# Patient Record
Sex: FEMALE | Race: WHITE | Employment: FULL TIME | URBAN - METROPOLITAN AREA
[De-identification: names, ages, dates, MRNs, and addresses within clinical notes are randomized per-mention and may not be internally consistent; named-entity substitution may affect disease eponyms.]

---

## 2019-07-11 ENCOUNTER — APPOINTMENT (OUTPATIENT)
Dept: MRI IMAGING | Facility: CLINIC | Age: 29
End: 2019-07-11
Attending: EMERGENCY MEDICINE
Payer: MEDICAID

## 2019-07-11 ENCOUNTER — HOSPITAL ENCOUNTER (EMERGENCY)
Facility: CLINIC | Age: 29
Discharge: HOME | End: 2019-07-11
Attending: EMERGENCY MEDICINE | Admitting: EMERGENCY MEDICINE
Payer: MEDICAID

## 2019-07-11 VITALS
HEIGHT: 67 IN | DIASTOLIC BLOOD PRESSURE: 92 MMHG | SYSTOLIC BLOOD PRESSURE: 129 MMHG | TEMPERATURE: 98 F | WEIGHT: 205 LBS | BODY MASS INDEX: 32.18 KG/M2 | RESPIRATION RATE: 16 BRPM | OXYGEN SATURATION: 100 % | HEART RATE: 77 BPM

## 2019-07-11 DIAGNOSIS — R20.8 DYSESTHESIA: ICD-10-CM

## 2019-07-11 LAB — B-HCG FREE SERPL-ACNC: <5 IU/L

## 2019-07-11 PROCEDURE — 84702 CHORIONIC GONADOTROPIN TEST: CPT

## 2019-07-11 PROCEDURE — 99285 EMERGENCY DEPT VISIT HI MDM: CPT | Mod: 25

## 2019-07-11 PROCEDURE — 70553 MRI BRAIN STEM W/O & W/DYE: CPT

## 2019-07-11 PROCEDURE — 25500064 ZZH RX 255 OP 636: Performed by: EMERGENCY MEDICINE

## 2019-07-11 PROCEDURE — A9585 GADOBUTROL INJECTION: HCPCS | Performed by: EMERGENCY MEDICINE

## 2019-07-11 RX ORDER — GADOBUTROL 604.72 MG/ML
10 INJECTION INTRAVENOUS ONCE
Status: COMPLETED | OUTPATIENT
Start: 2019-07-11 | End: 2019-07-11

## 2019-07-11 RX ADMIN — GADOBUTROL 9 ML: 604.72 INJECTION INTRAVENOUS at 13:45

## 2019-07-11 ASSESSMENT — ENCOUNTER SYMPTOMS
NAUSEA: 0
DIARRHEA: 0
SPEECH DIFFICULTY: 0
NUMBNESS: 1
TROUBLE SWALLOWING: 0
VOMITING: 0

## 2019-07-11 ASSESSMENT — MIFFLIN-ST. JEOR: SCORE: 1692.5

## 2019-07-11 NOTE — DISCHARGE INSTRUCTIONS
Please make an appointment to follow up with [facility] [phone] as soon as possible.    You have a number of changes on your MRI called demyelination which is destruction of the insulation surrounding the nerves.  This is most concerning for multiple sclerosis or MS.

## 2019-07-11 NOTE — LETTER
07/11/19      To Whom it may concern:    ____Sarah Feldman__________ was in our Emergency Department today, 07/11/19. with a patient who needed their assistance.  Please excuse them from work/school.      Sincerely,

## 2019-07-11 NOTE — ED AVS SNAPSHOT
[facility] Emergency Department  [address]  [facility] [address]  Phone:  [phone]  Fax:  [phone]                                    Sera Crabtree   MRN: [id number]    Department:  [facility] Emergency Department   Date of Visit:  7/11/2019           After Visit Summary Signature Page    I have received my discharge instructions, and my questions have been answered. I have discussed any challenges I see with this plan with the nurse or doctor.    ..........................................................................................................................................  Patient/Patient Representative Signature      ..........................................................................................................................................  Patient Representative Print Name and Relationship to Patient    ..................................................               ................................................  Date                                   Time    ..........................................................................................................................................  Reviewed by Signature/Title    ...................................................              ..............................................  Date                                               Time          22EPIC Rev 08/18

## 2019-07-11 NOTE — ED PROVIDER NOTES
"  History     Chief Complaint:  Numbness      HPI   Sera Crabtree is a 29 year old female who presents to the ED for evaluation of numbness. The patient says that her symptoms began two days ago (Tuesday night). She says that she started feeling numbness and tingling in the left lower extremity. The next day (Wednesday morning), she says she felt the numbness in the left upper extremity. This morning she says she began getting numbness in the face and noticed some ringing in right ear. The patient says that she still has sensation everywhere, but cold sensations make her leg feel prickly. She also says that her left leg and foot feel really cold. She finds relief from her discomfort when she crosses her leg or takes a bath. The patient denies vision changes, problems swallowing or speaking, vomiting, nausea, and diarrhea.    Allergies:  No known drug allergies    Medications:    Norethindrone   metrogel    Past Medical History:    History reviewed.  No pertinent past medical history.    Past Surgical History:    History reviewed. No pertinent surgical history.    Family History:    History reviewed. No pertinent family history.     Social History:  Smoking status: Passive Smoke Exposure-Never Smoker  Arrived to the ED alone    Review of Systems   HENT: Positive for tinnitus. Negative for trouble swallowing.    Eyes: Negative for visual disturbance.   Gastrointestinal: Negative for diarrhea, nausea and vomiting.   Neurological: Positive for numbness. Negative for speech difficulty.   All other systems reviewed and are negative.      Physical Exam   First Vitals:  Patient Vitals for the past 24 hrs:   BP Temp Temp src Heart Rate Resp SpO2 Height Weight   07/11/19 1114 (!) 139/101 98  F (36.7  C) Temporal 83 16 99 % 1.702 m (5' 7\") 93 kg (205 lb)     Physical Exam    General:   Pleasant, age appropriate female resting comfortably in the bed.  HEENT:    Oropharynx is moist, without lesions or trismus.  Eyes: "    Conjunctiva normal     PERRL; EOMs intact  Neck:    Supple, no meningismus.     CV:     Regular rate and rhythm.      No murmurs, rubs or gallops.       No unilateral leg swelling.       2+ radial pulses bilateral.       No lower extremity edema.  PULM:    Clear to auscultation bilateral.       No respiratory distress.      Good air exchange.     No rales or wheezing.  ABD:    Soft, non-tender, non-distended.       No pulsatile masses.       No rebound, guarding or rigidity.  MSK:     No gross deformity to all four extremities.   LYMPH:   No cervical lymphadenopathy.  NEURO:   Alert & O x 3     CN II-XII intact, speech is clear with no aphasia.       Finger to nose within normal limits.  No pronator drift.       Strength is 5/5 in all 4 extremities.  Sensation is intact to light touch and pinprick      Normal muscular tone, no tremor.  Skin:    Warm, dry and intact.    Psych:    Mood is good and affect is appropriate.        Emergency Department Course       Imaging:  Radiographic findings were communicated with the patient who voiced understanding of the findings.    MRI Brain w/o & w Contrast  IMPRESSION:     IMPRESSION: Multiple abnormal white matter lesions with orientation  strongly suggestive of demyelinating disease. There are  periventricular white matter lesions as well as a few subcortical and  juxtacortical lesions and a lesion in the right brachium pontis. No  associated contrast enhancement with these lesions to suggest active  demyelination.        TOÑO PORTILLO MD    Laboratory:  ISTAT HCG Quantitative Pregnancy POCT: <5.0    Interventions:  1318 Gadavist 9 mL IV    Emergency Department Course:  Past medical records, nursing notes, and vitals reviewed.  1120: I performed an exam of the patient and obtained history, as documented above.    IV inserted and blood drawn.    The patient was sent for a Brain MRI while in the emergency department, findings above.    Consulted Dr. Pola Jackson of  neurology    Impression & Plan      Medical Decision Makin-year-old female presented to the ED with dysesthesias on the left face and upper/lower extremity.  She had basic laboratory studies as an outpatient which revealed no evidence of thyroid disease or acute electrolyte disturbance to account for symptoms.  MRI undertaken which unfortunately reveals multiple areas concerning for demyelinating disease specifically multiple sclerosis.  No active lesions noted.  I spoke with Dr. Jackson of neurology who feels patient is safe for discharge home with close follow-up in their clinic.  Patient made aware of the likely diagnosis of multiple sclerosis and the need to closely follow-up with neurology.    Diagnosis:    ICD-10-CM    1. Dysesthesia R20.8 ISTAT HCG Quantitative Pregnancy POCT     ISTAT HCG Quantitative Pregnancy POCT     CANCELED: MRI Brain w & w/o contrast       Disposition:  Signed off to Dr. Gurwinder Escoto  2019   [facility] EMERGENCY DEPARTMENT  Scribe Disclosure:  I, Antonio Escoto, am serving as a scribe at 1:31 PM on 2019 to document services personally performed by Breezy Vega MD based on my observations and the provider's statements to me. '     Breezy Vega MD  07/15/19 1600

## 2019-07-12 ENCOUNTER — TRANSFERRED RECORDS (OUTPATIENT)
Dept: HEALTH INFORMATION MANAGEMENT | Facility: CLINIC | Age: 29
End: 2019-07-12

## 2019-07-24 ENCOUNTER — TRANSFERRED RECORDS (OUTPATIENT)
Dept: HEALTH INFORMATION MANAGEMENT | Facility: CLINIC | Age: 29
End: 2019-07-24

## 2019-08-22 ENCOUNTER — MEDICAL CORRESPONDENCE (OUTPATIENT)
Dept: HEALTH INFORMATION MANAGEMENT | Facility: CLINIC | Age: 29
End: 2019-08-22

## 2019-08-22 DIAGNOSIS — G35 MULTIPLE SCLEROSIS (H): ICD-10-CM

## 2019-08-22 RX ORDER — HEPARIN SODIUM,PORCINE 10 UNIT/ML
5 VIAL (ML) INTRAVENOUS
Status: CANCELLED | OUTPATIENT
Start: 2019-08-24

## 2019-08-22 RX ORDER — HEPARIN SODIUM (PORCINE) LOCK FLUSH IV SOLN 100 UNIT/ML 100 UNIT/ML
5 SOLUTION INTRAVENOUS
Status: CANCELLED | OUTPATIENT
Start: 2019-08-24

## 2019-08-24 ENCOUNTER — INFUSION THERAPY VISIT (OUTPATIENT)
Dept: INFUSION THERAPY | Facility: CLINIC | Age: 29
End: 2019-08-24
Attending: INTERNAL MEDICINE
Payer: COMMERCIAL

## 2019-08-24 VITALS
HEART RATE: 77 BPM | DIASTOLIC BLOOD PRESSURE: 86 MMHG | RESPIRATION RATE: 16 BRPM | SYSTOLIC BLOOD PRESSURE: 119 MMHG | OXYGEN SATURATION: 98 % | TEMPERATURE: 98.2 F

## 2019-08-24 DIAGNOSIS — G35 MULTIPLE SCLEROSIS (H): Primary | ICD-10-CM

## 2019-08-24 PROCEDURE — 25800030 ZZH RX IP 258 OP 636: Performed by: PSYCHIATRY & NEUROLOGY

## 2019-08-24 PROCEDURE — 25000128 H RX IP 250 OP 636: Performed by: PSYCHIATRY & NEUROLOGY

## 2019-08-24 PROCEDURE — 96365 THER/PROPH/DIAG IV INF INIT: CPT

## 2019-08-24 RX ORDER — GABAPENTIN 300 MG/1
300 CAPSULE ORAL DAILY
COMMUNITY

## 2019-08-24 RX ORDER — HEPARIN SODIUM (PORCINE) LOCK FLUSH IV SOLN 100 UNIT/ML 100 UNIT/ML
5 SOLUTION INTRAVENOUS
Status: CANCELLED | OUTPATIENT
Start: 2019-08-25

## 2019-08-24 RX ORDER — ACETAMINOPHEN AND CODEINE PHOSPHATE 120; 12 MG/5ML; MG/5ML
0.35 SOLUTION ORAL DAILY
COMMUNITY

## 2019-08-24 RX ORDER — HEPARIN SODIUM,PORCINE 10 UNIT/ML
5 VIAL (ML) INTRAVENOUS
Status: CANCELLED | OUTPATIENT
Start: 2019-08-25

## 2019-08-24 RX ADMIN — SODIUM CHLORIDE 1000 MG: 9 INJECTION, SOLUTION INTRAVENOUS at 11:47

## 2019-08-24 ASSESSMENT — PAIN SCALES - GENERAL: PAINLEVEL: NO PAIN (0)

## 2019-08-24 NOTE — PROGRESS NOTES
Infusion Nursing Note:  Sera Crabtree presents today for solumedrol infusion  Patient seen by provider today: No   present during visit today: Not Applicable.    Note: N/A.    Intravenous Access:  Peripheral IV placed.    Treatment Conditions:  Not Applicable.      Post Infusion Assessment:  Patient tolerated infusion without incident.  Site patent and intact, free from redness, edema or discomfort.  No evidence of extravasations.  Access discontinued per protocol.       Discharge Plan:   AVS to patient via MYCHART.  Patient will return 8/25 for next appointment.   Patient discharged in stable condition accompanied by: self.  Departure Mode: Ambulatory.    Cristóbal Naik, RN, RN

## 2019-08-25 ENCOUNTER — INFUSION THERAPY VISIT (OUTPATIENT)
Dept: INFUSION THERAPY | Facility: CLINIC | Age: 29
End: 2019-08-25
Attending: INTERNAL MEDICINE
Payer: COMMERCIAL

## 2019-08-25 VITALS
RESPIRATION RATE: 18 BRPM | TEMPERATURE: 98.2 F | HEART RATE: 86 BPM | DIASTOLIC BLOOD PRESSURE: 77 MMHG | SYSTOLIC BLOOD PRESSURE: 123 MMHG | OXYGEN SATURATION: 98 %

## 2019-08-25 DIAGNOSIS — G35 MULTIPLE SCLEROSIS (H): Primary | ICD-10-CM

## 2019-08-25 PROCEDURE — 96365 THER/PROPH/DIAG IV INF INIT: CPT

## 2019-08-25 PROCEDURE — 25800030 ZZH RX IP 258 OP 636: Performed by: PSYCHIATRY & NEUROLOGY

## 2019-08-25 PROCEDURE — 25000128 H RX IP 250 OP 636: Performed by: PSYCHIATRY & NEUROLOGY

## 2019-08-25 RX ORDER — HYDROXYZINE PAMOATE 25 MG/1
25 CAPSULE ORAL 3 TIMES DAILY PRN
COMMUNITY

## 2019-08-25 RX ORDER — HEPARIN SODIUM,PORCINE 10 UNIT/ML
5 VIAL (ML) INTRAVENOUS
Status: CANCELLED | OUTPATIENT
Start: 2019-08-26

## 2019-08-25 RX ORDER — OMEGA-3 FATTY ACIDS/FISH OIL 300-1000MG
800 CAPSULE ORAL EVERY 6 HOURS PRN
COMMUNITY

## 2019-08-25 RX ORDER — SERTRALINE HYDROCHLORIDE 25 MG/1
25 TABLET, FILM COATED ORAL DAILY
COMMUNITY

## 2019-08-25 RX ORDER — HEPARIN SODIUM (PORCINE) LOCK FLUSH IV SOLN 100 UNIT/ML 100 UNIT/ML
5 SOLUTION INTRAVENOUS
Status: CANCELLED | OUTPATIENT
Start: 2019-08-26

## 2019-08-25 RX ADMIN — SODIUM CHLORIDE 1000 MG: 9 INJECTION, SOLUTION INTRAVENOUS at 09:42

## 2019-08-25 ASSESSMENT — PAIN SCALES - GENERAL: PAINLEVEL: SEVERE PAIN (7)

## 2019-08-25 NOTE — PROGRESS NOTES
Infusion Nursing Note:  Sera Crabtree presents today for solumedrol.    Patient seen by provider today: No   present during visit today: Not Applicable.    Note: N/A.    Intravenous Access:  Peripheral IV placed.    Treatment Conditions:  Not Applicable.      Post Infusion Assessment:  Patient tolerated infusion without incident.  Blood return noted pre and post infusion.  Site patent and intact, free from redness, edema or discomfort.  No evidence of extravasations.  Access discontinued per protocol.       Discharge Plan:   Discharge instructions reviewed with: Patient.  Patient and/or family verbalized understanding of discharge instructions and all questions answered.  Patient discharged in stable condition accompanied by: self.  Departure Mode: Ambulatory.    Cristóbal Naik, RN, RN

## 2019-08-26 ENCOUNTER — INFUSION THERAPY VISIT (OUTPATIENT)
Dept: INFUSION THERAPY | Facility: CLINIC | Age: 29
End: 2019-08-26
Attending: INTERNAL MEDICINE
Payer: COMMERCIAL

## 2019-08-26 DIAGNOSIS — G35 MULTIPLE SCLEROSIS (H): Primary | ICD-10-CM

## 2019-08-26 PROCEDURE — 25800030 ZZH RX IP 258 OP 636: Performed by: PSYCHIATRY & NEUROLOGY

## 2019-08-26 PROCEDURE — 25000128 H RX IP 250 OP 636: Performed by: PSYCHIATRY & NEUROLOGY

## 2019-08-26 PROCEDURE — 96365 THER/PROPH/DIAG IV INF INIT: CPT

## 2019-08-26 RX ORDER — HEPARIN SODIUM (PORCINE) LOCK FLUSH IV SOLN 100 UNIT/ML 100 UNIT/ML
5 SOLUTION INTRAVENOUS
Status: CANCELLED | OUTPATIENT
Start: 2019-08-27

## 2019-08-26 RX ORDER — HEPARIN SODIUM,PORCINE 10 UNIT/ML
5 VIAL (ML) INTRAVENOUS
Status: CANCELLED | OUTPATIENT
Start: 2019-08-27

## 2019-08-26 RX ADMIN — METHYLPREDNISOLONE SODIUM SUCCINATE 1000 MG: 1 INJECTION, POWDER, LYOPHILIZED, FOR SOLUTION INTRAMUSCULAR; INTRAVENOUS at 13:03

## 2019-08-26 NOTE — PROGRESS NOTES
Infusion Nursing Note:  Sera Crabtree presents today for solu-mederol.    Patient seen by provider today: No   present during visit today: Not Applicable.    Note: Difficult IV stick. Hands work best.    Intravenous Access:  Peripheral IV placed.    Treatment Conditions:  Not Applicable.      Post Infusion Assessment:  Patient tolerated infusion without incident.  Blood return noted pre and post infusion.  Site patent and intact, free from redness, edema or discomfort.  No evidence of extravasations.  Access discontinued per protocol.       Discharge Plan:   Discharge instructions reviewed with: Patient.  Patient and/or family verbalized understanding of discharge instructions and all questions answered.  Patient discharged in stable condition accompanied by: self.  Departure Mode: Ambulatory.    Callie Corcoran, RN, RN

## 2019-08-27 ENCOUNTER — INFUSION THERAPY VISIT (OUTPATIENT)
Dept: INFUSION THERAPY | Facility: CLINIC | Age: 29
End: 2019-08-27
Attending: INTERNAL MEDICINE
Payer: COMMERCIAL

## 2019-08-27 VITALS
TEMPERATURE: 98.6 F | RESPIRATION RATE: 18 BRPM | OXYGEN SATURATION: 100 % | DIASTOLIC BLOOD PRESSURE: 89 MMHG | SYSTOLIC BLOOD PRESSURE: 149 MMHG | HEART RATE: 58 BPM

## 2019-08-27 DIAGNOSIS — G35 MULTIPLE SCLEROSIS (H): Primary | ICD-10-CM

## 2019-08-27 PROCEDURE — 25000128 H RX IP 250 OP 636: Performed by: PSYCHIATRY & NEUROLOGY

## 2019-08-27 PROCEDURE — 96365 THER/PROPH/DIAG IV INF INIT: CPT

## 2019-08-27 PROCEDURE — 25800030 ZZH RX IP 258 OP 636: Performed by: PSYCHIATRY & NEUROLOGY

## 2019-08-27 RX ORDER — HEPARIN SODIUM,PORCINE 10 UNIT/ML
5 VIAL (ML) INTRAVENOUS
Status: CANCELLED | OUTPATIENT
Start: 2019-08-28

## 2019-08-27 RX ORDER — HEPARIN SODIUM (PORCINE) LOCK FLUSH IV SOLN 100 UNIT/ML 100 UNIT/ML
5 SOLUTION INTRAVENOUS
Status: CANCELLED | OUTPATIENT
Start: 2019-08-28

## 2019-08-27 RX ADMIN — SODIUM CHLORIDE 1000 MG: 9 INJECTION, SOLUTION INTRAVENOUS at 10:21

## 2019-08-27 NOTE — PROGRESS NOTES
Infusion Nursing Note:  Sera Crabtree presents today for Solumedrol.    Patient seen by provider today: No   present during visit today: Not Applicable.    Note: Patient states she feels like she has to take deeper breaths today, but unsure if it's related to her anxiety.  Pt denies SOB or chest pain.  Vitals stable and oxygen sat 99%.  She states she tried calling her clinic yesterday, but hasn't heard back.  Upon completion of solumedrol today, she states the feeling is worse.  RN will attempt to call Dr. Jackson prior to discharge today.  Patient only has 1 solumedrol left tomorrow.      Dr. Howard's returned patient's call stating he is ok for patient to discharge home, but we will discontinue future solumedrol infusions at this point.  Pt verbalizes understanding and agrees with plan.  Appt 8/28 cancelled.  Ward Freitas RN    Intravenous Access:  Peripheral IV placed.    Treatment Conditions:  Not Applicable.      Post Infusion Assessment:  Patient tolerated infusion without incident.  Blood return noted pre and post infusion.  Site patent and intact, free from redness, edema or discomfort.  No evidence of extravasations.  Access discontinued per protocol.       Discharge Plan:   Discharge instructions reviewed with: Patient.  Patient and/or family verbalized understanding of discharge instructions and all questions answered.  Copy of AVS reviewed with patient and/or family.  Patient will return 8/28/19 for next appointment.  Patient discharged in stable condition accompanied by: self.  Departure Mode: Ambulatory.    Ward Freitas, POLLY, RN

## 2021-06-05 ENCOUNTER — HEALTH MAINTENANCE LETTER (OUTPATIENT)
Age: 31
End: 2021-06-05

## 2021-09-25 ENCOUNTER — HEALTH MAINTENANCE LETTER (OUTPATIENT)
Age: 31
End: 2021-09-25

## 2022-07-02 ENCOUNTER — HEALTH MAINTENANCE LETTER (OUTPATIENT)
Age: 32
End: 2022-07-02

## 2022-12-26 ENCOUNTER — HEALTH MAINTENANCE LETTER (OUTPATIENT)
Age: 32
End: 2022-12-26

## 2023-07-09 ENCOUNTER — HEALTH MAINTENANCE LETTER (OUTPATIENT)
Age: 33
End: 2023-07-09